# Patient Record
Sex: FEMALE | Race: OTHER | NOT HISPANIC OR LATINO | Employment: OTHER | URBAN - METROPOLITAN AREA
[De-identification: names, ages, dates, MRNs, and addresses within clinical notes are randomized per-mention and may not be internally consistent; named-entity substitution may affect disease eponyms.]

---

## 2017-08-14 NOTE — PATIENT DISCUSSION
POSTERIOR VITREOUS DETACHMENT, OD:  NO HOLES. NO TEARS. RETINAL  DETACHMENT WARNINGS DISCUSSED. RETURN FOR FOLLOW-UP AS SCHEDULED.

## 2017-08-22 ENCOUNTER — ESTABLISHED COMPREHENSIVE EXAM (OUTPATIENT)
Dept: URBAN - METROPOLITAN AREA CLINIC 39 | Facility: CLINIC | Age: 72
End: 2017-08-22

## 2017-08-22 DIAGNOSIS — H26.493: ICD-10-CM

## 2017-08-22 DIAGNOSIS — H40.013: ICD-10-CM

## 2017-08-22 DIAGNOSIS — H04.123: ICD-10-CM

## 2017-08-22 DIAGNOSIS — H43.393: ICD-10-CM

## 2017-08-22 DIAGNOSIS — Z96.1: ICD-10-CM

## 2017-08-22 PROCEDURE — 92014 COMPRE OPH EXAM EST PT 1/>: CPT

## 2017-08-22 PROCEDURE — 92015 DETERMINE REFRACTIVE STATE: CPT

## 2017-08-22 ASSESSMENT — VISUAL ACUITY
OS_SC: J1
OS_SC: 20/25
OD_SC: 20/20-2
OD_SC: J3

## 2017-08-22 ASSESSMENT — TONOMETRY
OS_IOP_MMHG: 14
OD_IOP_MMHG: 14

## 2017-09-19 ENCOUNTER — CONTACT LENS FOLLOW UP (OUTPATIENT)
Dept: URBAN - METROPOLITAN AREA CLINIC 39 | Facility: CLINIC | Age: 72
End: 2017-09-19

## 2017-09-19 DIAGNOSIS — Z96.1: ICD-10-CM

## 2017-09-19 DIAGNOSIS — H26.493: ICD-10-CM

## 2017-09-19 PROCEDURE — 92310AV CL MGMNT ADVANCED VISION TRIAL ALL INCL

## 2017-09-19 ASSESSMENT — VISUAL ACUITY
OD_CC: J2
OU_SC: J1
OD_CC: 20/20
OS_SC: 20/25
OS_SC: J1

## 2017-09-21 ENCOUNTER — CONTACT LENS FOLLOW UP (OUTPATIENT)
Dept: URBAN - METROPOLITAN AREA CLINIC 39 | Facility: CLINIC | Age: 72
End: 2017-09-21

## 2017-09-21 DIAGNOSIS — H52.203: ICD-10-CM

## 2017-09-21 PROCEDURE — 92310F

## 2017-09-21 ASSESSMENT — VISUAL ACUITY
OU_SC: 20/25-1
OU_SC: J1
OS_SC: J1
OD_CC: J2
OD_CC: 20/30-1
OS_SC: 20/25-1

## 2017-10-23 ENCOUNTER — CATARACT CONSULT (OUTPATIENT)
Dept: URBAN - METROPOLITAN AREA CLINIC 39 | Facility: CLINIC | Age: 72
End: 2017-10-23

## 2017-10-23 ENCOUNTER — SURGERY/PROCEDURE (OUTPATIENT)
Dept: URBAN - METROPOLITAN AREA CLINIC 39 | Facility: CLINIC | Age: 72
End: 2017-10-23

## 2017-10-23 DIAGNOSIS — H26.491: ICD-10-CM

## 2017-10-23 DIAGNOSIS — H26.492: ICD-10-CM

## 2017-10-23 PROCEDURE — 66821 AFTER CATARACT LASER SURGERY: CPT

## 2017-10-23 PROCEDURE — 92014 COMPRE OPH EXAM EST PT 1/>: CPT

## 2017-10-23 ASSESSMENT — VISUAL ACUITY
OD_SC: 20/25
OS_BAT: 20/40
OU_SC: 20/25
OD_BAT: 20/40
OS_SC: 20/25

## 2017-10-23 ASSESSMENT — TONOMETRY
OS_IOP_MMHG: 14
OD_IOP_MMHG: 14

## 2017-11-01 ENCOUNTER — POST-OP CATARACT (OUTPATIENT)
Dept: URBAN - METROPOLITAN AREA CLINIC 39 | Facility: CLINIC | Age: 72
End: 2017-11-01

## 2017-11-01 DIAGNOSIS — Z98.890: ICD-10-CM

## 2017-11-01 PROCEDURE — 99024 POSTOP FOLLOW-UP VISIT: CPT

## 2017-11-01 ASSESSMENT — TONOMETRY
OS_IOP_MMHG: 14
OD_IOP_MMHG: 14

## 2017-11-01 ASSESSMENT — VISUAL ACUITY
OS_SC: 20/25-1
OD_SC: 20/30

## 2018-05-24 ENCOUNTER — ESTABLISHED COMPREHENSIVE EXAM (OUTPATIENT)
Dept: URBAN - METROPOLITAN AREA CLINIC 39 | Facility: CLINIC | Age: 73
End: 2018-05-24

## 2018-05-24 DIAGNOSIS — H04.123: ICD-10-CM

## 2018-05-24 DIAGNOSIS — H26.492: ICD-10-CM

## 2018-05-24 PROCEDURE — 92014 COMPRE OPH EXAM EST PT 1/>: CPT

## 2018-05-24 PROCEDURE — 92015 DETERMINE REFRACTIVE STATE: CPT

## 2018-05-24 ASSESSMENT — VISUAL ACUITY
OD_SC: 20/60
OD_SC: J12
OS_SC: J4
OS_BAT: 20/50
OS_SC: 20/30

## 2018-05-24 ASSESSMENT — TONOMETRY
OS_IOP_MMHG: 14
OD_IOP_MMHG: 14

## 2018-08-02 NOTE — PATIENT DISCUSSION
POSTERIOR VITREOUS DETACHMENT, OD__:  NO HOLES. NO TEARS. RETINAL  DETACHMENT WARNINGS DISCUSSED. FLOATERS AND FLASHES BROCHURE GIVEN. RETURN FOR FOLLOW-UP AS SCHEDULED.

## 2018-08-02 NOTE — PATIENT DISCUSSION
CATARACTS, OU: VISUALLY SIGNIFICANT. OPTION OF SURGERY VERSUS FOLLOWING VERSUS UPDATING GLASSES DISCUSSED. RBA'S DISCUSSED, PATIENT UNDERSTANDS AND DESIRES SURGERY TO INCREASE VISION FOR WATCHING TV AND READING.   SCHEDULE CATARACT SURGERY/PRE-OP OU

## 2018-10-01 NOTE — PATIENT DISCUSSION
CATARACT SX OD 10/09/2018: RBA'S DISCUSSED, PATIENT UNDERSTANDS AND DESIRES TO PROCEED WITH SURGERY. CONSENT READ AND SIGNED. PATIENT DESIRES STANDARD FOR DISTANCE.

## 2018-12-10 NOTE — PATIENT DISCUSSION
Pre-Op 2nd Eye Counseling: The patient has noticed an improvement in their visual symptoms in the operative eye. The patient complains of decreased vision in the fellow eye when watching TV. It was explained to the patient that the decision to proceed with cataract surgery in the fellow eye is entirely a separate decision from the surgical eye. All of the same risks, benefits and alternatives ere reviewed with the patient again. The patient does feel the vision in the non-operative eye is limiting their daily activities and elects to proceed with cataract surgery in the LEFT eye.   Schedule cataract surgery/ pre op OS

## 2018-12-10 NOTE — PATIENT DISCUSSION
CATARACT OS: RBA'S DISCUSSED, PATIENT UNDERSTANDS AND DESIRES TO PROCEED WITH SURGERY. CONSENT READ AND SIGNED. PATIENT DESIRES STANDARD SET FOR DISTANCE.

## 2021-04-22 ENCOUNTER — ESTABLISHED COMPREHENSIVE EXAM (OUTPATIENT)
Dept: URBAN - METROPOLITAN AREA CLINIC 39 | Facility: CLINIC | Age: 76
End: 2021-04-22

## 2021-04-22 DIAGNOSIS — H52.03: ICD-10-CM

## 2021-04-22 DIAGNOSIS — H52.4: ICD-10-CM

## 2021-04-22 DIAGNOSIS — H52.7: ICD-10-CM

## 2021-04-22 DIAGNOSIS — H52.203: ICD-10-CM

## 2021-04-22 PROCEDURE — 92014 COMPRE OPH EXAM EST PT 1/>: CPT

## 2021-04-22 PROCEDURE — 92015 DETERMINE REFRACTIVE STATE: CPT

## 2021-04-22 ASSESSMENT — VISUAL ACUITY
OU_SC: J1
OD_SC: 20/40-2
OS_BAT: 20/50
OU_SC: 20/25-2
OS_SC: J2
OD_SC: J8
OS_SC: 20/30-1

## 2021-04-22 ASSESSMENT — TONOMETRY
OD_IOP_MMHG: 16
OS_IOP_MMHG: 16

## 2021-08-23 NOTE — PATIENT DISCUSSION
08/23/2021OS-2.00+2.09635+[phone]/25 +2&nbsp;SN &nbsp; &nbsp; [facility]

## 2022-06-06 ENCOUNTER — COMPREHENSIVE EXAM (OUTPATIENT)
Dept: URBAN - METROPOLITAN AREA CLINIC 40 | Facility: CLINIC | Age: 77
End: 2022-06-06

## 2022-06-06 DIAGNOSIS — H52.03: ICD-10-CM

## 2022-06-06 DIAGNOSIS — H52.7: ICD-10-CM

## 2022-06-06 DIAGNOSIS — H52.203: ICD-10-CM

## 2022-06-06 PROCEDURE — 92015 DETERMINE REFRACTIVE STATE: CPT

## 2022-06-06 PROCEDURE — 92014 COMPRE OPH EXAM EST PT 1/>: CPT

## 2022-06-06 ASSESSMENT — TONOMETRY
OD_IOP_MMHG: 15
OS_IOP_MMHG: 15

## 2022-06-06 ASSESSMENT — VISUAL ACUITY
OS_SC: J2
OD_SC: J8
OU_SC: 20/30-1
OD_SC: 20/50-1
OS_BAT: 20/50
OU_SC: J1
OS_SC: 20/30-1

## 2023-06-12 ENCOUNTER — COMPREHENSIVE EXAM (OUTPATIENT)
Dept: URBAN - METROPOLITAN AREA CLINIC 40 | Facility: CLINIC | Age: 78
End: 2023-06-12

## 2023-12-13 NOTE — PATIENT DISCUSSION
08/17/2020OS-1.50+2.55331+[phone]/25&nbsp;SN &nbsp; &nbsp; clk
Anti-reflective
BLEPHARITIS, OU: PRESCRIBE WARM COMPRESSES AND EYELID SCRUBS QD-BID, ARTIFICIAL TEARS BID-QID, AND ERYTHROMYCIN OPHTHALMIC OINTMENT EVERY NIGHT AS NEEDED. RETURN FOR FOLLOW-UP AS SCHEDULED.
Comments:Any Combo
Continue: Artificial Tears (cmc) (carboxymethylcellulose sodium): drops: 1%
DRY EYE SYNDROME OU: RX ARTIFICIAL TEARS AS NEEDED TO INCREASE COMFORT OU. IF SYMPTOMS PERSIST CONSIDER PUNCTAL PLUGS.
General:
Glasses Prescribed:
JAMES OU:  PRESCRIBED UV PROTECTION TO SLOW GROWTH. PRESCRIBE ARTIFICAL TEARS TO INCREASE COMFORT.
Lens Material:
Lens Treatment:
Manual Manifest - Daily wearOD-0.75+2.30243+[id number]/20&nbsp;SN &nbsp; &nbsp; clk
Medications:
POSTERIOR CAPSULAR FIBROSIS OU:  PROGRESSING, UPDATED GLASSES RX GIVEN.
POSTERIOR VITREOUS DETACHMENT, OD:  NO HOLES. NO TEARS. RETINAL  DETACHMENT WARNINGS DISCUSSED. FLOATERS AND FLASHES BROCHURE GIVEN. RETURN FOR FOLLOW-UP AS SCHEDULED.
Slab Off:No
UV Protection
Vertex Distance:
spherecylinderaxisaddprismvertexVAInt VANVExaminer
No